# Patient Record
Sex: FEMALE | Race: OTHER | NOT HISPANIC OR LATINO | Employment: FULL TIME | URBAN - METROPOLITAN AREA
[De-identification: names, ages, dates, MRNs, and addresses within clinical notes are randomized per-mention and may not be internally consistent; named-entity substitution may affect disease eponyms.]

---

## 2021-11-22 ENCOUNTER — OFFICE VISIT (OUTPATIENT)
Dept: URGENT CARE | Facility: CLINIC | Age: 56
End: 2021-11-22
Payer: COMMERCIAL

## 2021-11-22 ENCOUNTER — APPOINTMENT (OUTPATIENT)
Dept: RADIOLOGY | Facility: CLINIC | Age: 56
End: 2021-11-22
Attending: FAMILY MEDICINE
Payer: COMMERCIAL

## 2021-11-22 VITALS
HEART RATE: 88 BPM | DIASTOLIC BLOOD PRESSURE: 74 MMHG | RESPIRATION RATE: 16 BRPM | TEMPERATURE: 98.8 F | SYSTOLIC BLOOD PRESSURE: 140 MMHG | OXYGEN SATURATION: 100 %

## 2021-11-22 DIAGNOSIS — T14.90XA INJURY: ICD-10-CM

## 2021-11-22 DIAGNOSIS — S20.212A CONTUSION OF LEFT CHEST WALL, INITIAL ENCOUNTER: Primary | ICD-10-CM

## 2021-11-22 PROBLEM — E78.5 HYPERLIPIDEMIA: Status: ACTIVE | Noted: 2021-11-22

## 2021-11-22 PROBLEM — R00.2 PALPITATIONS: Status: ACTIVE | Noted: 2017-10-06

## 2021-11-22 PROCEDURE — 99203 OFFICE O/P NEW LOW 30 MIN: CPT | Performed by: FAMILY MEDICINE

## 2021-11-22 PROCEDURE — 71101 X-RAY EXAM UNILAT RIBS/CHEST: CPT

## 2021-11-22 RX ORDER — HYDROCODONE BITARTRATE AND ACETAMINOPHEN 5; 325 MG/1; MG/1
TABLET ORAL
COMMUNITY
Start: 2021-11-15

## 2021-11-22 RX ORDER — BLOOD-GLUCOSE TRANSMITTER
EACH MISCELLANEOUS
COMMUNITY
Start: 2021-09-27

## 2021-11-22 RX ORDER — CHLORHEXIDINE GLUCONATE 0.12 MG/ML
RINSE ORAL
COMMUNITY
Start: 2021-11-15

## 2021-11-22 RX ORDER — ROSUVASTATIN CALCIUM 20 MG/1
TABLET, COATED ORAL
COMMUNITY
Start: 2021-11-02

## 2021-11-22 RX ORDER — NAPROXEN 500 MG/1
500 TABLET ORAL EVERY 12 HOURS
Qty: 20 TABLET | Refills: 0 | Status: SHIPPED | OUTPATIENT
Start: 2021-11-22

## 2021-11-22 RX ORDER — BLOOD-GLUCOSE SENSOR
EACH MISCELLANEOUS
COMMUNITY
Start: 2021-09-27

## 2024-06-17 ENCOUNTER — OFFICE VISIT (OUTPATIENT)
Dept: URGENT CARE | Facility: CLINIC | Age: 59
End: 2024-06-17
Payer: COMMERCIAL

## 2024-06-17 VITALS
SYSTOLIC BLOOD PRESSURE: 143 MMHG | BODY MASS INDEX: 24.43 KG/M2 | WEIGHT: 152 LBS | TEMPERATURE: 98.1 F | RESPIRATION RATE: 16 BRPM | HEART RATE: 100 BPM | OXYGEN SATURATION: 98 % | DIASTOLIC BLOOD PRESSURE: 84 MMHG | HEIGHT: 66 IN

## 2024-06-17 DIAGNOSIS — H66.92 ACUTE LEFT OTITIS MEDIA: Primary | ICD-10-CM

## 2024-06-17 DIAGNOSIS — H10.33 ACUTE BACTERIAL CONJUNCTIVITIS OF BOTH EYES: ICD-10-CM

## 2024-06-17 DIAGNOSIS — J06.9 VIRAL URI WITH COUGH: ICD-10-CM

## 2024-06-17 LAB — S PYO AG THROAT QL: NEGATIVE

## 2024-06-17 PROCEDURE — 99213 OFFICE O/P EST LOW 20 MIN: CPT | Performed by: FAMILY MEDICINE

## 2024-06-17 PROCEDURE — 87880 STREP A ASSAY W/OPTIC: CPT | Performed by: FAMILY MEDICINE

## 2024-06-17 RX ORDER — TOBRAMYCIN 3 MG/ML
1 SOLUTION/ DROPS OPHTHALMIC EVERY 6 HOURS
Qty: 5 ML | Refills: 0 | Status: SHIPPED | OUTPATIENT
Start: 2024-06-17 | End: 2024-06-24

## 2024-06-17 RX ORDER — ASPIRIN 81 MG/1
81 TABLET ORAL DAILY
COMMUNITY

## 2024-06-17 RX ORDER — AMOXICILLIN AND CLAVULANATE POTASSIUM 875; 125 MG/1; MG/1
1 TABLET, FILM COATED ORAL EVERY 12 HOURS SCHEDULED
Qty: 14 TABLET | Refills: 0 | Status: SHIPPED | OUTPATIENT
Start: 2024-06-17 | End: 2024-06-24

## 2024-06-17 NOTE — PROGRESS NOTES
[facility]        NAME: Lory Pruitt is a 58 y.o. female  : 1965    MRN: [id number]  DATE: 2024  TIME: 9:34 AM    Assessment and Plan   Acute left otitis media [H66.92]  1. Acute left otitis media  POCT rapid ANTIGEN strepA    amoxicillin-clavulanate (AUGMENTIN) 875-125 mg per tablet      2. Viral URI with cough        3. Acute bacterial conjunctivitis of both eyes  tobramycin (TOBREX) 0.3 % SOLN        Patient Instructions     Patient Instructions   - Augmentin x 7 days prescribed, complete as directed  - Tobramycin eye drops prescribed, use as directed   - patient is to rest and drink plenty of fluids  - take Tylenol or Motrin as needed for pain/fever   - advised warm salt water gargles and throat lozenges as needed   - drink warm tea w/ lemon and honey   - run a humidifier at home to help w/ congestion   - advised using Flonase nasal spray to help w/ nasal/sinus symptoms   - may take Mucinex as needed for cough/chest congestion   - if symptoms persist despite treatment, worsen, or any new symptoms present, should be seen by PCP office for re-check.    Follow up with PCP in 3-5 days.  Proceed to  ER if symptoms worsen.    If tests have been performed at [facility], our office will contact you with results if changes need to be made to the care plan discussed with you at the visit.  You can review your full results on [facility].    Chief Complaint     Chief Complaint   Patient presents with    Cold Like Symptoms     Sinus symptoms with bilateral pink eye, ill since Friday, took OTC cold meds.     History of Present Illness       59 yo female presents c/o headache sinus pressure, nasal congestion, rhinorrhea, post-nasal drip, sore throat, and cough. She has been ill x 3 days. No fever/chills or body aches. She states she woke up this morning with bilateral eye redness, crusting, and discharge. Patient does not wear contact lenses. No chest pain, SOB, or wheezing. Patient is a  smoker. No GI sx. No skin rashes. No loss of taste or smell. No recent travel or known sick contacts. Patient has no known allergies. She has taken OTC cold meds and used Mucinex nasal spray for the symptoms.      Review of Systems   Review of Systems   Constitutional: Negative.    HENT:  Positive for congestion, postnasal drip, rhinorrhea, sinus pressure, sinus pain, sore throat and voice change.    Eyes:  Positive for discharge and redness. Negative for photophobia, pain and visual disturbance.   Respiratory:  Positive for cough. Negative for shortness of breath and wheezing.    Cardiovascular: Negative.    Gastrointestinal: Negative.    Musculoskeletal: Negative.    Skin: Negative.    Allergic/Immunologic: Negative.    Neurological: Negative.    Hematological: Negative.          Current Medications       Current Outpatient Medications:     amoxicillin-clavulanate (AUGMENTIN) 875-125 mg per tablet, Take 1 tablet by mouth every 12 (twelve) hours for 7 days, Disp: 14 tablet, Rfl: 0    aspirin (ECOTRIN LOW STRENGTH) 81 mg EC tablet, Take 81 mg by mouth daily, Disp: , Rfl:     Continuous Blood Gluc Sensor (Dexcom G6 Sensor) MISC, USE AS DIRECTED. REPLACE SENSOR EVERY 10 DAYS, Disp: , Rfl:     Continuous Blood Gluc Transmit (Dexcom G6 Transmitter) MISC, USE AS DIRECTED. REPLACE EVERY 90 DAYS, Disp: , Rfl:     HumaLOG 100 UNIT/ML injection, , Disp: , Rfl:     rosuvastatin (CRESTOR) 20 MG tablet, , Disp: , Rfl:     tobramycin (TOBREX) 0.3 % SOLN, Administer 1 drop to both eyes every 6 (six) hours for 7 days, Disp: 5 mL, Rfl: 0    chlorhexidine (PERIDEX) 0.12 % solution, , Disp: , Rfl:     HYDROcodone-acetaminophen (NORCO) 5-325 mg per tablet,   (Patient not taking: Reported on 6/17/2024), Disp: , Rfl:     naproxen (Naprosyn) 500 mg tablet, Take 1 tablet (500 mg total) by mouth every 12 (twelve) hours (Patient not taking: Reported on 6/17/2024), Disp: 20 tablet, Rfl: 0    Current Allergies     Allergies as of 06/17/2024  "   (No Known Allergies)            The following portions of the patient's history were reviewed and updated as appropriate: allergies, current medications, past family history, past medical history, past social history, past surgical history and problem list.     Past Medical History:   Diagnosis Date    Diabetes mellitus (HCC)     Hyperlipidemia        Past Surgical History:   Procedure Laterality Date    SHOULDER SURGERY      TUBAL LIGATION         History reviewed. No pertinent family history.      Medications have been verified.        Objective   /84   Pulse 100   Temp 98.1 °F (36.7 °C)   Resp 16   Ht 5' 6\" (1.676 m)   Wt 68.9 kg (152 lb)   SpO2 98%   BMI 24.53 kg/m²   No LMP recorded. Patient is postmenopausal.       Physical Exam     Physical Exam  Vitals and nursing note reviewed.   Constitutional:       General: She is awake. She is not in acute distress.     Appearance: Normal appearance. She is well-developed and well-groomed. She is not ill-appearing, toxic-appearing or diaphoretic.   HENT:      Head: Normocephalic and atraumatic.      Jaw: There is normal jaw occlusion.      Right Ear: Tympanic membrane, ear canal and external ear normal.      Left Ear: Ear canal and external ear normal. There is no impacted cerumen. No foreign body. No mastoid tenderness. Tympanic membrane is erythematous and bulging. Tympanic membrane is not perforated.      Nose: Mucosal edema and congestion present.      Right Sinus: Maxillary sinus tenderness and frontal sinus tenderness present.      Left Sinus: Maxillary sinus tenderness and frontal sinus tenderness present.      Mouth/Throat:      Lips: Pink. No lesions.      Mouth: Mucous membranes are moist.      Pharynx: Uvula midline. Posterior oropharyngeal erythema and postnasal drip present. No pharyngeal swelling, oropharyngeal exudate or uvula swelling.      Tonsils: No tonsillar exudate or tonsillar abscesses.   Eyes:      General: Lids are normal. Vision " grossly intact. Gaze aligned appropriately.         Right eye: Discharge present. No foreign body or hordeolum.         Left eye: Discharge present.No foreign body or hordeolum.      Extraocular Movements: Extraocular movements intact.      Conjunctiva/sclera:      Right eye: Right conjunctiva is injected.      Left eye: Left conjunctiva is injected.      Pupils: Pupils are equal, round, and reactive to light.   Neck:      Trachea: Trachea and phonation normal.   Cardiovascular:      Rate and Rhythm: Normal rate and regular rhythm.      Pulses: Normal pulses.      Heart sounds: Normal heart sounds.   Pulmonary:      Effort: Pulmonary effort is normal. No tachypnea, accessory muscle usage or respiratory distress.      Breath sounds: Normal breath sounds and air entry.   Musculoskeletal:      Cervical back: Neck supple. No edema, erythema, rigidity or tenderness.   Lymphadenopathy:      Cervical: No cervical adenopathy.   Skin:     General: Skin is warm and dry.      Capillary Refill: Capillary refill takes less than 2 seconds.      Coloration: Skin is not pale.   Neurological:      Mental Status: She is alert and oriented to person, place, and time. Mental status is at baseline.   Psychiatric:         Mood and Affect: Mood normal.         Behavior: Behavior normal. Behavior is cooperative.         Thought Content: Thought content normal.         Judgment: Judgment normal.

## 2024-06-17 NOTE — PATIENT INSTRUCTIONS
- Augmentin x 7 days prescribed, complete as directed  - Tobramycin eye drops prescribed, use as directed   - patient is to rest and drink plenty of fluids  - take Tylenol or Motrin as needed for pain/fever   - advised warm salt water gargles and throat lozenges as needed   - drink warm tea w/ lemon and honey   - run a humidifier at home to help w/ congestion   - advised using Flonase nasal spray to help w/ nasal/sinus symptoms   - may take Mucinex as needed for cough/chest congestion   - if symptoms persist despite treatment, worsen, or any new symptoms present, should be seen by PCP office for re-check.

## 2024-07-01 ENCOUNTER — OFFICE VISIT (OUTPATIENT)
Dept: URGENT CARE | Facility: CLINIC | Age: 59
End: 2024-07-01
Payer: COMMERCIAL

## 2024-07-01 VITALS
DIASTOLIC BLOOD PRESSURE: 77 MMHG | TEMPERATURE: 97.2 F | HEART RATE: 96 BPM | WEIGHT: 152 LBS | SYSTOLIC BLOOD PRESSURE: 138 MMHG | OXYGEN SATURATION: 98 % | RESPIRATION RATE: 16 BRPM | HEIGHT: 66 IN | BODY MASS INDEX: 24.43 KG/M2

## 2024-07-01 DIAGNOSIS — J01.90 ACUTE BACTERIAL SINUSITIS: Primary | ICD-10-CM

## 2024-07-01 DIAGNOSIS — H69.92 EUSTACHIAN TUBE DYSFUNCTION, LEFT: ICD-10-CM

## 2024-07-01 DIAGNOSIS — B96.89 ACUTE BACTERIAL SINUSITIS: Primary | ICD-10-CM

## 2024-07-01 PROCEDURE — 99213 OFFICE O/P EST LOW 20 MIN: CPT | Performed by: FAMILY MEDICINE

## 2024-07-01 RX ORDER — FLUTICASONE PROPIONATE 50 MCG
1 SPRAY, SUSPENSION (ML) NASAL DAILY
Qty: 16 G | Refills: 0 | Status: SHIPPED | OUTPATIENT
Start: 2024-07-01

## 2024-07-01 RX ORDER — PREDNISONE 20 MG/1
20 TABLET ORAL DAILY
Qty: 5 TABLET | Refills: 0 | Status: SHIPPED | OUTPATIENT
Start: 2024-07-01 | End: 2024-07-06

## 2024-07-01 RX ORDER — AZITHROMYCIN 250 MG/1
TABLET, FILM COATED ORAL
Qty: 6 TABLET | Refills: 0 | Status: SHIPPED | OUTPATIENT
Start: 2024-07-01 | End: 2024-07-05

## 2024-07-01 NOTE — PROGRESS NOTES
[facility]        NAME: Lory Pruitt is a 58 y.o. female  : 1965    MRN: [id number]  DATE: 2024  TIME: 9:47 AM    Assessment and Plan   Acute bacterial sinusitis [J01.90, B96.89]  1. Acute bacterial sinusitis  azithromycin (ZITHROMAX) 250 mg tablet    predniSONE 20 mg tablet    fluticasone (FLONASE) 50 mcg/act nasal spray      2. Eustachian tube dysfunction, left  azithromycin (ZITHROMAX) 250 mg tablet    predniSONE 20 mg tablet    fluticasone (FLONASE) 50 mcg/act nasal spray        Patient Instructions     Patient Instructions   Zithromax prescribed, complete as directed.   Prednisone x 5 days prescribed, complete as directed, side effects discussed, take w/ food and water, closely monitor blood glucose levels while taking steroid.   Flonase nasal spray prescribed, use as directed.   Take Mucinex as needed for cough/chest congestion.  Take Tylenol as needed, no NSAIDs while taking steroids due to increased risk of GI side effects.   Patient is to rest and drink plenty of fluids.   Advised to run a humidifier at home.   Drink warm tea w/ lemon and honey   Advised warm salt water gargles and throat lozenges as needed.   Follow up w/ PCP office for re-check in 3-5 days.  If symptoms persist despite treatment, worsen, or any new symptoms present, patient is to be seen in the ER.     Follow up with PCP in 3-5 days.  Proceed to  ER if symptoms worsen.    If tests have been performed at [facility], our office will contact you with results if changes need to be made to the care plan discussed with you at the visit.  [facility]    Chief Complaint     Chief Complaint   Patient presents with    Flu Symptoms     Pt states she does not feel any better after Augmentin. She states her ears are still clogged and the cough is awful.      History of Present Illness     57 yo female presents c/o headache, sinus pain and pressure, nasal congestion, nasal drainage,  post-nasal drip, left ear feels clogged, sore throat, chest congestion and productive cough. She has been ill x 2 weeks. No fever, but she has felt chills and body aches. No chest pain, SOB, or wheezing. Patient is a daily smoker. No GI sx. No skin rashes. No loss of taste or smell. No recent travel or known sick contacts. Patient has no known allergies. She was seen in our office on 6/17 and treated w/ Augmentin x 7 days for acute left ear otitis media. She states her URI /sinus symptoms improved while she was on the Augmentin but never resolved. She has also been using Mucinex nasal spray for the symptoms.      Review of Systems   Review of Systems   Constitutional: Negative.    HENT:  Positive for congestion, postnasal drip, rhinorrhea, sinus pressure, sinus pain and sore throat.    Eyes: Negative.    Respiratory:  Positive for cough.    Gastrointestinal: Negative.    Musculoskeletal: Negative.    Skin: Negative.    Allergic/Immunologic: Negative.    Neurological: Negative.    Hematological: Negative.      Current Medications       Current Outpatient Medications:     aspirin (ECOTRIN LOW STRENGTH) 81 mg EC tablet, Take 81 mg by mouth daily, Disp: , Rfl:     azithromycin (ZITHROMAX) 250 mg tablet, Take 2 tablets today then 1 tablet daily x 4 days, Disp: 6 tablet, Rfl: 0    Continuous Blood Gluc Sensor (Dexcom G6 Sensor) MISC, USE AS DIRECTED. REPLACE SENSOR EVERY 10 DAYS, Disp: , Rfl:     Continuous Blood Gluc Transmit (Dexcom G6 Transmitter) MISC, USE AS DIRECTED. REPLACE EVERY 90 DAYS, Disp: , Rfl:     fluticasone (FLONASE) 50 mcg/act nasal spray, 1 spray into each nostril daily, Disp: 16 g, Rfl: 0    HumaLOG 100 UNIT/ML injection, , Disp: , Rfl:     predniSONE 20 mg tablet, Take 1 tablet (20 mg total) by mouth daily for 5 days, Disp: 5 tablet, Rfl: 0    rosuvastatin (CRESTOR) 20 MG tablet, , Disp: , Rfl:     chlorhexidine (PERIDEX) 0.12 % solution, , Disp: , Rfl:     HYDROcodone-acetaminophen (NORCO) 5-325 mg  "per tablet,   (Patient not taking: Reported on 6/17/2024), Disp: , Rfl:     naproxen (Naprosyn) 500 mg tablet, Take 1 tablet (500 mg total) by mouth every 12 (twelve) hours (Patient not taking: Reported on 6/17/2024), Disp: 20 tablet, Rfl: 0    Current Allergies     Allergies as of 07/01/2024    (No Known Allergies)            The following portions of the patient's history were reviewed and updated as appropriate: allergies, current medications, past family history, past medical history, past social history, past surgical history and problem list.     Past Medical History:   Diagnosis Date    Diabetes mellitus (HCC)     Hyperlipidemia        Past Surgical History:   Procedure Laterality Date    SHOULDER SURGERY      TUBAL LIGATION         History reviewed. No pertinent family history.      Medications have been verified.        Objective   /77   Pulse 96   Temp (!) 97.2 °F (36.2 °C)   Resp 16   Ht 5' 6\" (1.676 m)   Wt 68.9 kg (152 lb)   SpO2 98%   BMI 24.53 kg/m²   No LMP recorded. Patient is postmenopausal.    Physical Exam     Physical Exam  Vitals and nursing note reviewed.   Constitutional:       General: She is awake. She is not in acute distress.     Appearance: Normal appearance. She is well-developed and well-groomed. She is not ill-appearing, toxic-appearing or diaphoretic.   HENT:      Head: Normocephalic and atraumatic.      Jaw: There is normal jaw occlusion.      Right Ear: Tympanic membrane, ear canal and external ear normal.      Left Ear: Tympanic membrane, ear canal and external ear normal.      Nose: Congestion present.      Right Sinus: Maxillary sinus tenderness and frontal sinus tenderness present.      Left Sinus: Maxillary sinus tenderness and frontal sinus tenderness present.      Mouth/Throat:      Lips: Pink. No lesions.      Mouth: Mucous membranes are moist.      Pharynx: Uvula midline. Posterior oropharyngeal erythema and postnasal drip present. No pharyngeal swelling, " oropharyngeal exudate or uvula swelling.      Tonsils: No tonsillar exudate or tonsillar abscesses.   Eyes:      General: Lids are normal.      Conjunctiva/sclera: Conjunctivae normal.   Neck:      Trachea: Trachea and phonation normal.   Cardiovascular:      Rate and Rhythm: Normal rate and regular rhythm.      Pulses: Normal pulses.      Heart sounds: Normal heart sounds.   Pulmonary:      Effort: Pulmonary effort is normal. No tachypnea, accessory muscle usage or respiratory distress.      Breath sounds: Normal breath sounds and air entry.   Musculoskeletal:      Cervical back: Neck supple. No edema, erythema, rigidity or tenderness.   Lymphadenopathy:      Cervical: No cervical adenopathy.   Skin:     General: Skin is warm and dry.      Capillary Refill: Capillary refill takes less than 2 seconds.   Neurological:      Mental Status: She is alert and oriented to person, place, and time. Mental status is at baseline.   Psychiatric:         Mood and Affect: Mood normal.         Behavior: Behavior normal. Behavior is cooperative.         Thought Content: Thought content normal.         Judgment: Judgment normal.

## 2024-07-01 NOTE — PATIENT INSTRUCTIONS
Zithromax prescribed, complete as directed.   Prednisone x 5 days prescribed, complete as directed, side effects discussed, take w/ food and water, closely monitor blood glucose levels while taking steroid.   Flonase nasal spray prescribed, use as directed.   Take Mucinex as needed for cough/chest congestion.  Take Tylenol as needed, no NSAIDs while taking steroids due to increased risk of GI side effects.   Patient is to rest and drink plenty of fluids.   Advised to run a humidifier at home.   Drink warm tea w/ lemon and honey   Advised warm salt water gargles and throat lozenges as needed.   Follow up w/ PCP office for re-check in 3-5 days.  If symptoms persist despite treatment, worsen, or any new symptoms present, patient is to be seen in the ER.